# Patient Record
Sex: FEMALE | Race: OTHER | Employment: UNEMPLOYED | URBAN - METROPOLITAN AREA
[De-identification: names, ages, dates, MRNs, and addresses within clinical notes are randomized per-mention and may not be internally consistent; named-entity substitution may affect disease eponyms.]

---

## 2018-09-17 ENCOUNTER — APPOINTMENT (OUTPATIENT)
Dept: CT IMAGING | Age: 48
End: 2018-09-17
Attending: EMERGENCY MEDICINE
Payer: SELF-PAY

## 2018-09-17 ENCOUNTER — HOSPITAL ENCOUNTER (EMERGENCY)
Age: 48
Discharge: HOME | End: 2018-09-17
Attending: EMERGENCY MEDICINE
Payer: SELF-PAY

## 2018-09-17 VITALS
SYSTOLIC BLOOD PRESSURE: 110 MMHG | OXYGEN SATURATION: 97 % | TEMPERATURE: 97.9 F | RESPIRATION RATE: 20 BRPM | DIASTOLIC BLOOD PRESSURE: 65 MMHG | BODY MASS INDEX: 28.56 KG/M2 | WEIGHT: 151.25 LBS | HEIGHT: 61 IN | HEART RATE: 79 BPM

## 2018-09-17 DIAGNOSIS — K76.0 HEPATIC STEATOSIS: ICD-10-CM

## 2018-09-17 DIAGNOSIS — R10.84 ABDOMINAL PAIN, GENERALIZED: Primary | ICD-10-CM

## 2018-09-17 DIAGNOSIS — D25.9 UTERINE LEIOMYOMA, UNSPECIFIED LOCATION: ICD-10-CM

## 2018-09-17 LAB
ALBUMIN SERPL-MCNC: 3.7 G/DL (ref 3.5–5)
ALBUMIN/GLOB SERPL: 0.9 {RATIO} (ref 1.1–2.2)
ALP SERPL-CCNC: 79 U/L (ref 45–117)
ALT SERPL-CCNC: 34 U/L (ref 12–78)
ANION GAP SERPL CALC-SCNC: 5 MMOL/L (ref 5–15)
APPEARANCE UR: CLEAR
AST SERPL-CCNC: 19 U/L (ref 15–37)
BACTERIA URNS QL MICRO: NEGATIVE /HPF
BASOPHILS # BLD: 0 K/UL (ref 0–0.1)
BASOPHILS NFR BLD: 1 % (ref 0–1)
BILIRUB SERPL-MCNC: 0.3 MG/DL (ref 0.2–1)
BILIRUB UR QL: NEGATIVE
BUN SERPL-MCNC: 13 MG/DL (ref 6–20)
BUN/CREAT SERPL: 16 (ref 12–20)
CALCIUM SERPL-MCNC: 8.6 MG/DL (ref 8.5–10.1)
CHLORIDE SERPL-SCNC: 107 MMOL/L (ref 97–108)
CLUE CELLS VAG QL WET PREP: NORMAL
CO2 SERPL-SCNC: 27 MMOL/L (ref 21–32)
COLOR UR: NORMAL
COMMENT, HOLDF: NORMAL
CREAT SERPL-MCNC: 0.8 MG/DL (ref 0.55–1.02)
DIFFERENTIAL METHOD BLD: ABNORMAL
EOSINOPHIL # BLD: 0.2 K/UL (ref 0–0.4)
EOSINOPHIL NFR BLD: 3 % (ref 0–7)
EPITH CASTS URNS QL MICRO: NORMAL /LPF
ERYTHROCYTE [DISTWIDTH] IN BLOOD BY AUTOMATED COUNT: 13.6 % (ref 11.5–14.5)
GLOBULIN SER CALC-MCNC: 3.9 G/DL (ref 2–4)
GLUCOSE SERPL-MCNC: 133 MG/DL (ref 65–100)
GLUCOSE UR STRIP.AUTO-MCNC: NEGATIVE MG/DL
HCG UR QL: NEGATIVE
HCT VFR BLD AUTO: 36.3 % (ref 35–47)
HGB BLD-MCNC: 11.5 G/DL (ref 11.5–16)
HGB UR QL STRIP: NEGATIVE
HYALINE CASTS URNS QL MICRO: NORMAL /LPF (ref 0–5)
IMM GRANULOCYTES # BLD: 0 K/UL (ref 0–0.04)
IMM GRANULOCYTES NFR BLD AUTO: 0 % (ref 0–0.5)
KETONES UR QL STRIP.AUTO: NEGATIVE MG/DL
KOH PREP SPEC: NORMAL
LEUKOCYTE ESTERASE UR QL STRIP.AUTO: NEGATIVE
LIPASE SERPL-CCNC: 198 U/L (ref 73–393)
LYMPHOCYTES # BLD: 3.8 K/UL (ref 0.8–3.5)
LYMPHOCYTES NFR BLD: 49 % (ref 12–49)
MCH RBC QN AUTO: 27.7 PG (ref 26–34)
MCHC RBC AUTO-ENTMCNC: 31.7 G/DL (ref 30–36.5)
MCV RBC AUTO: 87.5 FL (ref 80–99)
MONOCYTES # BLD: 0.5 K/UL (ref 0–1)
MONOCYTES NFR BLD: 6 % (ref 5–13)
NEUTS SEG # BLD: 3.2 K/UL (ref 1.8–8)
NEUTS SEG NFR BLD: 41 % (ref 32–75)
NITRITE UR QL STRIP.AUTO: NEGATIVE
NRBC # BLD: 0 K/UL (ref 0–0.01)
NRBC BLD-RTO: 0 PER 100 WBC
PH UR STRIP: 6.5 [PH] (ref 5–8)
PLATELET # BLD AUTO: 334 K/UL (ref 150–400)
PMV BLD AUTO: 9.2 FL (ref 8.9–12.9)
POTASSIUM SERPL-SCNC: 4 MMOL/L (ref 3.5–5.1)
PROT SERPL-MCNC: 7.6 G/DL (ref 6.4–8.2)
PROT UR STRIP-MCNC: NEGATIVE MG/DL
RBC # BLD AUTO: 4.15 M/UL (ref 3.8–5.2)
RBC #/AREA URNS HPF: NORMAL /HPF (ref 0–5)
SAMPLES BEING HELD,HOLD: NORMAL
SERVICE CMNT-IMP: NORMAL
SODIUM SERPL-SCNC: 139 MMOL/L (ref 136–145)
SP GR UR REFRACTOMETRY: 1.01 (ref 1–1.03)
T VAGINALIS VAG QL WET PREP: NORMAL
UR CULT HOLD, URHOLD: NORMAL
UROBILINOGEN UR QL STRIP.AUTO: 0.2 EU/DL (ref 0.2–1)
WBC # BLD AUTO: 7.7 K/UL (ref 3.6–11)
WBC URNS QL MICRO: NORMAL /HPF (ref 0–4)

## 2018-09-17 PROCEDURE — 87491 CHLMYD TRACH DNA AMP PROBE: CPT | Performed by: EMERGENCY MEDICINE

## 2018-09-17 PROCEDURE — 74011000258 HC RX REV CODE- 258: Performed by: EMERGENCY MEDICINE

## 2018-09-17 PROCEDURE — 83690 ASSAY OF LIPASE: CPT | Performed by: EMERGENCY MEDICINE

## 2018-09-17 PROCEDURE — 74177 CT ABD & PELVIS W/CONTRAST: CPT

## 2018-09-17 PROCEDURE — 87210 SMEAR WET MOUNT SALINE/INK: CPT | Performed by: EMERGENCY MEDICINE

## 2018-09-17 PROCEDURE — 99285 EMERGENCY DEPT VISIT HI MDM: CPT

## 2018-09-17 PROCEDURE — 74011636320 HC RX REV CODE- 636/320: Performed by: EMERGENCY MEDICINE

## 2018-09-17 PROCEDURE — 81001 URINALYSIS AUTO W/SCOPE: CPT | Performed by: EMERGENCY MEDICINE

## 2018-09-17 PROCEDURE — 36415 COLL VENOUS BLD VENIPUNCTURE: CPT | Performed by: EMERGENCY MEDICINE

## 2018-09-17 PROCEDURE — 81025 URINE PREGNANCY TEST: CPT

## 2018-09-17 PROCEDURE — 80053 COMPREHEN METABOLIC PANEL: CPT | Performed by: EMERGENCY MEDICINE

## 2018-09-17 PROCEDURE — 85025 COMPLETE CBC W/AUTO DIFF WBC: CPT | Performed by: EMERGENCY MEDICINE

## 2018-09-17 RX ORDER — SODIUM CHLORIDE 0.9 % (FLUSH) 0.9 %
10 SYRINGE (ML) INJECTION
Status: COMPLETED | OUTPATIENT
Start: 2018-09-17 | End: 2018-09-17

## 2018-09-17 RX ADMIN — IOPAMIDOL 100 ML: 755 INJECTION, SOLUTION INTRAVENOUS at 10:32

## 2018-09-17 RX ADMIN — Medication 10 ML: at 10:32

## 2018-09-17 RX ADMIN — SODIUM CHLORIDE 100 ML: 900 INJECTION, SOLUTION INTRAVENOUS at 10:32

## 2018-09-17 NOTE — DISCHARGE INSTRUCTIONS
Dolor abdominal: Instrucciones de cuidado - [ Abdominal Pain: Care Instructions ]  Instrucciones de cuidado    El dolor abdominal tiene muchas causas posibles. Algunas de ellas no son graves y mejoran por sí solas en unos días. Otras requieren [place] y [place]. Si hardy dolor continúa o [place], necesitará neyda nueva revisión y [place] pruebas para determinar qué pasa. Es posible que necesite cirugía para corregir el problema. No ignore nuevos síntomas, janelle fiebre, náuseas y [place], [facility], dolor que [place] o [place]. Podrían ser señales de un problema más grave. Hardy médico puede haberle recomendado neyda consulta de [employer] las 8 o 12 horas siguientes. Si no se siente mejor, es posible que requiera [place] o [place]. El médico lo martinez revisado minuciosamente, hiral puede barbie problemas más tarde. Si nota algún problema o síntomas nuevos, busque tratamiento médico inmediatamente. La atención de seguimiento es neyda parte clave de hardy tratamiento y seguridad. Asegúrese de hacer y acudir a todas las citas, y llame a hardy médico si está teniendo problemas. También es neyda buena idea saber los resultados de nancy exámenes y mantener neyda lista de los medicamentos que dev. ¿Cómo puede cuidarse en el hogar? · Descanse hasta que se sienta mejor. · Para prevenir la deshidratación, lee abundantes líquidos, suficientes para que hardy orina sea de color amarillo natali o transparente janelle el agua. Elija beber agua y otros líquidos paul sin cafeína hasta que se sienta mejor. Si tiene [employer], del corazón o del hígado y tiene que Yves's líquidos, hable con hardy médico antes de aumentar hardy consumo. · Si tiene [employer], coma alimentos suaves, janelle arroz, pan amy seco o galletas saladas, bananas (plátanos) y puré de [employer]. Trate de comer varias comidas pequeñas al día en lugar de dos o rell grandes.   · Espere hasta 50 horas después de que [employer] síntomas hayan desaparecido antes de comer alimentos condimentados, alcohol y bebidas que contengan cafeína. · No consuma alimentos ricos en grasa. · Evite medicamentos antiinflamatorios janelle aspirina, ibuprofeno (Advil, Motrin) y naproxeno (Aleve). Pueden causar [employer]. Dígale a hardy médico si está tomando aspirina diariamente debido a otro problema de david. ¿Cuándo debe pedir ayuda? Llame al 911 en cualquier momento que considere que necesita atención de emergencia. Por ejemplo, llame si:    · Se desmayó (perdió el conocimiento).   · Las heces son de color rojizo o muy sanguinolentas (con moreno).   · Vomita moreno o algo parecido a granos de café molido.     · Tiene dolor abdominal nuevo e intenso.    Llame a hardy médico ahora mismo o busque atención médica inmediata si:    · Hardy dolor empeora, sobre todo si se concentra en neyda halle parte del vientre.     · Vuelve a tener fiebre o tiene fiebre más wes.     · Kari heces son negruzcas y parecidas al alquitrán o tienen rastros de moreno.     · Tiene sangrado vaginal inesperado.     · Tiene síntomas de neyda infección del tracto urinario. Estos podrían incluir:  ¨ Dolor al [place]. ¨ Orinar con más frecuencia que lo habitual.  ¨ Moreno en la [facility].     · Siente mareos o aturdimiento, o que está a punto de desmayarse.    Preste especial atención a los cambios en hardy david y asegúrese de comunicarse con hardy médico si:    · No está mejorando después de 1 día (24 horas). ¿Dónde puede encontrar más información en inglés? Maykel Fairbanks a http://karely-spencer.info/. Lavon Mcginnis [id number] en la búsqueda para aprender más acerca de \"Dolor abdominal: Instrucciones de cuidado - [ Abdominal Pain: Care Instructions ]. \"  Revisado: 20 noviembre, 2017  Versión del contenido: 11.7  © 8653-8156 "[employer]. Las instrucciones de cuidado fueron adaptadas bajo licencia por [employer] (which disclaims liability or warranty for this information).  Si usted tiene [place] afección médica o sobre estas instrucciones, siempre pregunte a hardy profesional de david. Healthwise[employer] Incorporated niega toda garantía o responsabilidad por hardy uso de esta información. Fibromas uterinos: Instrucciones de cuidado - [ Uterine Fibroids: Care Instructions ]  Instrucciones de cuidado    Los fibromas uterinos son crecimientos en el útero. Los fibromas no son cáncer. Los médicos no conocen la causa de los fibromas. Son muy comunes en las mujeres florentino la edad reproductiva. Los fibromas pueden aparecer dentro del útero, en la pared muscular del mismo o cerca de hardy pared exterior. En algunas mujeres, los fibromas causan cólicos (retortijones) dolorosos y menstruaciones con sangrado intenso. En estos casos, emmanuel medicamentos antiinflamatorios, pastillas anticonceptivas o usar un dispositivo intrauterino (DIU), a menudo ayuda a reducir los síntomas. Algunas veces, se necesita cirugía para tratar los fibromas. Sin embargo, si se está acercando a la menopausia, quizá desee esperar para eliana si los síntomas mejoran. La mayoría de los fibromas se encogen y desaparecen después de la menopausia, cuando los períodos menstruales se detienen por completo. La atención de seguimiento es neyda parte clave de hardy tratamiento y seguridad. Asegúrese de hacer y acudir a todas las citas, y llame a hardy médico si está teniendo problemas. También es neyda buena idea saber los resultados de nancy exámenes y mantener neyda lista de los medicamentos que dev. ¿Cómo puede cuidarse en el hogar? · Si hardy médico le recetó un medicamento, tómelo exactamente janelle le fue recetado. Llame a hardy médico si lex estar teniendo problemas con hardy medicamento. · [place] antiinflamatorios para el dolor. Estos incluyen ibuprofeno (Advil, Motrin) y naproxeno (Aleve). Shari y siga todas las instrucciones de la [place].   · Utilice calor, por ejemplo, de neyda bolsa de [place] o neyda almohadilla térmica Bennie Todd a temperatura baja, o un baño tibio para relajar los músculos tensos y [employer]. Póngase un paño cid entre la almohadilla térmica y la piel. Nunca se duerma mientras [place] neyda almohadilla térmica. · Acuéstese y póngase neyda almohada debajo de las rodillas. O acuéstese de lado y [place] [address]. Estas posiciones pueden ayudar a aliviar el dolor o la presión en el abdomen. · Lleve la cuenta de la cantidad de toallas sanitarias o tampones que [place] cada día. · Jameel por lo menos 30 minutos de ejercicio la mayoría de los días de la [place]. Caminar es neyda buena opción. Es posible que también quiera hacer otras actividades, janelle correr, nadar, [employer], o jugar al tenis u otros deportes de equipo. · Si sangra por más tiempo que de costumbre o tiene sangrado abundante, tome un multivitamínico con km a diario. ¿Cuándo debes pedir ayuda? Llama a tu médico ahora mismo o busca atención médica inmediata si:    · Tienes sangrado vaginal intenso. [place] significa que empapas las toallas sanitarias o los tampones que sueles usar cada [place], florentino 2 o más horas.    Presta especial atención a los cambios en tu david y asegúrate de comunicarte con tu médico si:    · Tienes más sangrado vaginal, o el sangrado es más irregular. ¿Dónde puede encontrar más información en inglés? Uvaldo Noss a http://karely-spencer.info/. Escriba B121 en la búsqueda para aprender más acerca de \"Fibromas uterinos: Instrucciones de cuidado - [ Uterine Fibroids: Care Instructions ]. \"  Revisado: 6 octubre, 2017  Versión del contenido: 11.7  © 5938-1676 Healthwise, Incorporated. Las instrucciones de cuidado fueron adaptadas bajo licencia por [employer] (which disclaims liability or warranty for this information). Si usted tiene [place] afección médica o sobre estas instrucciones, siempre pregunte a hardy profesional de david.  [employer] [place] niega toda garantía o responsabilidad por hardy uso de esta información.

## 2018-09-17 NOTE — ED PROVIDER NOTES
HPI Comments: 57 y.o. female with past medical history significant for DM and high cholesterol who presents from home via private vehicle with chief complaint of abdominal pain. Pt reports onset 2 months ago of epigastric pain that radiates throughout LLQ with accompanying vaginal pain, described as \"burning\", and nausea. Pt's daughter reports history of DM and high cholesterol. Pt's daughter reports pt takes Metformin. Pt reports LMP was 3 months ago. Pt's daughter reports pt has not had colonoscopy. Pt's daughter reports pt receives care at [address] [place] [address]. Pt denies any fever, urinary symptoms, bloody stool, constipation, diarrhea, or vomiting. There are no other acute medical concerns at this time. Social hx: Non-smoker; No EtOH use Note written by Esperanza Lopez, as dictated by Courtney Almodovar MD 9:07 AM 
 
The history is provided by the patient and a relative (daugther). Past Medical History:  
Diagnosis Date  Diabetes ([address]  Hypercholesteremia History reviewed. No pertinent surgical history. History reviewed. No pertinent family history. Social History Social History  Marital status: SINGLE Spouse name: N/A  
 Number of children: N/A  
 Years of education: N/A Occupational History  Not on file. Social History Main Topics  Smoking status: Never Smoker  Smokeless tobacco: Never Used  Alcohol use No  
 Drug use: No  
 Sexual activity: Not on file Other Topics Concern  Not on file Social History Narrative  No narrative on file ALLERGIES: Review of patient's allergies indicates no known allergies. Review of Systems Constitutional: Negative for fever. Gastrointestinal: Positive for abdominal pain and nausea. Negative for constipation, diarrhea and vomiting. Genitourinary: Positive for vaginal pain. Negative for dysuria, frequency and hematuria. All other systems reviewed and are negative. Vitals:  
 09/17/18 1230 09/17/18 1300 09/17/18 1330 09/17/18 1400 BP: 90/52 109/65 115/64 120/75 Pulse:      
Resp:      
Temp:      
SpO2: 99% 97% 98% 98% Weight:      
Height:      
      
 
Physical Exam  
 
Nursing note and vitals reviewed. Constitutional: appears well-developed and well-nourished. No distress. HENT:  
Head: Normocephalic and atraumatic. Sclera anicteric Nose: No rhinorrhea Mouth/Throat: Oropharynx is clear and moist. Pharynx normal 
Eyes: Conjunctivae are normal. Pupils are equal, round, and reactive to light. Right eye exhibits no discharge. Left eye exhibits no discharge. No scleral icterus. Neck: Painless normal range of motion. Supple Cardiovascular: Normal rate, regular rhythm, normal heart sounds and intact distal pulses. Exam reveals no gallop and no friction rub. No murmur heard. Pulmonary/Chest: Effort normal and breath sounds normal. No respiratory distress. no wheezes. no rales. Abdominal: Soft. Bowel sounds are normal. Exhibits no distension and no mass. SUPRAPUBIC AND LLQ AND MILD EPIGASTRIC. No guarding. Musculoskeletal: Normal range of motion. no tenderness. No edema Lymphadenopathy:   No cervical adenopathy. Neurological:  Alert and oriented to person, place, and time. CN 2-12 intact. Moving all extremities. Skin: Skin is warm and dry. No rash noted. No pallor. MDM ABD PAIN, NAUSEA. AFEBRILE. TENDER EPIGASTRIC, SUPRAPUBIC AND LLQ. FEELS LIKE SOMETHING IS WRONG WITH HER VAGINAL AREA TOO. DDX:  VAGINAL PROLAPSE, CONSTIPATION, INTRAABDOMINAL MASS, CONSTIPATION AND OTHERS. CHECK CT ABD PELVIS, LABS, PELVIC Coral Knee, MD 
 
 
ED Course Procedures  EXAM:  External genitalia normal.  Pelvic exam: cervix normal, ovaries and uterus normal size and non-tender to palpation, no cervical motion tenderness or adnexal masses. No discharge or bleeding or foreign body.   
 
PROGRESS NOTE: 
2:26 PM 
 Discussed results with pt. Will discuss with PCP at [address] [place] [address] to arrange for follow up. 
 
3:45 PM 
[facility] appt for this Friday at 1030 am with Dr. Padmini Choi. Patient's results have been reviewed with them. Patient and/or family have verbally conveyed their understanding and agreement of the patient's signs, symptoms, diagnosis, treatment and prognosis and additionally agree to follow up as recommended or return to the Emergency Room should their condition change prior to follow-up. Discharge instructions have also been provided to the patient with some educational information regarding their diagnosis as well a list of reasons why they would want to return to the ER prior to their follow-up appointment should their condition change. Recent Results (from the past 24 hour(s)) CBC WITH AUTOMATED DIFF Collection Time: 09/17/18  9:13 AM  
Result Value Ref Range WBC 7.7 3.6 - 11.0 K/uL  
 RBC 4.15 3.80 - 5.20 M/uL  
 HGB 11.5 11.5 - 16.0 g/dL HCT 36.3 35.0 - 47.0 % MCV 87.5 80.0 - 99.0 FL  
 MCH 27.7 26.0 - 34.0 PG  
 MCHC 31.7 30.0 - 36.5 g/dL  
 RDW 13.6 11.5 - 14.5 % PLATELET 970 036 - 061 K/uL MPV 9.2 8.9 - 12.9 FL  
 NRBC 0.0 0  WBC ABSOLUTE NRBC 0.00 0.00 - 0.01 K/uL NEUTROPHILS 41 32 - 75 % LYMPHOCYTES 49 12 - 49 % MONOCYTES 6 5 - 13 % EOSINOPHILS 3 0 - 7 % BASOPHILS 1 0 - 1 % IMMATURE GRANULOCYTES 0 0.0 - 0.5 % ABS. NEUTROPHILS 3.2 1.8 - 8.0 K/UL  
 ABS. LYMPHOCYTES 3.8 (H) 0.8 - 3.5 K/UL  
 ABS. MONOCYTES 0.5 0.0 - 1.0 K/UL  
 ABS. EOSINOPHILS 0.2 0.0 - 0.4 K/UL  
 ABS. BASOPHILS 0.0 0.0 - 0.1 K/UL  
 ABS. IMM. GRANS. 0.0 0.00 - 0.04 K/UL  
 DF AUTOMATED METABOLIC PANEL, COMPREHENSIVE Collection Time: 09/17/18  9:13 AM  
Result Value Ref Range Sodium 139 136 - 145 mmol/L Potassium 4.0 3.5 - 5.1 mmol/L Chloride 107 97 - 108 mmol/L  
 CO2 27 21 - 32 mmol/L  Anion gap 5 5 - 15 mmol/L  
 Glucose 133 (H) 65 - 100 mg/dL BUN 13 6 - 20 MG/DL Creatinine 0.80 0.55 - 1.02 MG/DL  
 BUN/Creatinine ratio 16 12 - 20 GFR est AA >60 >60 ml/min/1.73m2 GFR est non-AA >60 >60 ml/min/1.73m2 Calcium 8.6 8.5 - 10.1 MG/DL Bilirubin, total 0.3 0.2 - 1.0 MG/DL  
 ALT (SGPT) 34 12 - 78 U/L  
 AST (SGOT) 19 15 - 37 U/L Alk. phosphatase 79 45 - 117 U/L Protein, total 7.6 6.4 - 8.2 g/dL Albumin 3.7 3.5 - 5.0 g/dL Globulin 3.9 2.0 - 4.0 g/dL A-G Ratio 0.9 (L) 1.1 - 2.2 LIPASE Collection Time: 09/17/18  9:13 AM  
Result Value Ref Range Lipase 198 73 - 393 U/L  
SAMPLES BEING HELD Collection Time: 09/17/18  9:13 AM  
Result Value Ref Range SAMPLES BEING HELD 1RED 1BLUE   
 COMMENT Add-on orders for these samples will be processed based on acceptable specimen integrity and analyte stability, which may vary by analyte. URINALYSIS W/MICROSCOPIC Collection Time: 09/17/18  9:13 AM  
Result Value Ref Range Color YELLOW/STRAW Appearance CLEAR CLEAR Specific gravity 1.012 1.003 - 1.030    
 pH (UA) 6.5 5.0 - 8.0 Protein NEGATIVE  NEG mg/dL Glucose NEGATIVE  NEG mg/dL Ketone NEGATIVE  NEG mg/dL Bilirubin NEGATIVE  NEG Blood NEGATIVE  NEG Urobilinogen 0.2 0.2 - 1.0 EU/dL Nitrites NEGATIVE  NEG Leukocyte Esterase NEGATIVE  NEG    
 WBC 0-4 0 - 4 /hpf  
 RBC 0-5 0 - 5 /hpf Epithelial cells FEW FEW /lpf Bacteria NEGATIVE  NEG /hpf Hyaline cast 0-2 0 - 5 /lpf URINE CULTURE HOLD SAMPLE Collection Time: 09/17/18  9:13 AM  
Result Value Ref Range Urine culture hold URINE ON HOLD IN MICROBIOLOGY DEPT FOR 3 DAYS. IF UNPRESERVED URINE IS SUBMITTED, IT CANNOT BE USED FOR ADDITIONAL TESTING AFTER 24 HRS, RECOLLECTION WILL BE REQUIRED. HCG URINE, QL. - POC Collection Time: 09/17/18  9:45 AM  
Result Value Ref Range  Pregnancy test,urine (POC) NEGATIVE  NEG    
KOH, OTHER SOURCES  
 Collection Time: 09/17/18  1:07 PM  
Result Value Ref Range Special Requests: NO SPECIAL REQUESTS    
 KOH NO YEAST SEEN    
WET PREP Collection Time: 09/17/18  1:07 PM  
Result Value Ref Range Clue cells CLUE CELLS ABSENT Wet prep NO TRICHOMONAS SEEN    
 
 
Ct Abd Pelv W Cont Result Date: 9/17/2018 EXAM:  CT ABD PELV W CONT INDICATION: abdominal pain, nausea. COMPARISON: None CONTRAST:  100 mL of Isovue-370. TECHNIQUE: Following the uneventful intravenous administration of contrast, thin axial images were obtained through the abdomen and pelvis. Coronal and sagittal reconstructions were generated. Oral contrast was not administered. CT dose reduction was achieved through use of a standardized protocol tailored for this examination and automatic exposure control for dose modulation. FINDINGS: LUNG BASES: Clear. INCIDENTALLY IMAGED HEART AND MEDIASTINUM: Unremarkable. LIVER: Hepatic steatosis is noted. There is a 7 mm cyst in the left hepatic lobe. GALLBLADDER: Unremarkable. SPLEEN: No mass. PANCREAS: No mass or ductal dilatation. ADRENALS: Unremarkable. KIDNEYS: No mass, calculus, or hydronephrosis. STOMACH: Unremarkable. SMALL BOWEL: No dilatation or wall thickening. COLON: No dilatation or wall thickening. APPENDIX: Unremarkable. PERITONEUM: No ascites or pneumoperitoneum. RETROPERITONEUM: No lymphadenopathy or aortic aneurysm. REPRODUCTIVE ORGANS: There is a 3.3 cm fundal fibroid. URINARY BLADDER: No mass or calculus. BONES: No destructive bone lesion. ADDITIONAL COMMENTS: N/A IMPRESSION: Fundal fibroid. Hepatic steatosis. No acute abnormality.

## 2018-09-17 NOTE — ED NOTES
Patient discharged home by Dr. Cosmo Mace. Patient has been instructed that appointment has been scheduled for Friday at 1030. Patient and family verbalized understanding of discharge instructions.

## 2018-09-19 LAB
C TRACH DNA SPEC QL NAA+PROBE: NEGATIVE
N GONORRHOEA DNA SPEC QL NAA+PROBE: NEGATIVE
SAMPLE TYPE: NORMAL
SERVICE CMNT-IMP: NORMAL
SPECIMEN SOURCE: NORMAL